# Patient Record
Sex: MALE | Race: WHITE | Employment: FULL TIME | URBAN - METROPOLITAN AREA
[De-identification: names, ages, dates, MRNs, and addresses within clinical notes are randomized per-mention and may not be internally consistent; named-entity substitution may affect disease eponyms.]

---

## 2021-06-16 ENCOUNTER — HOSPITAL ENCOUNTER (EMERGENCY)
Age: 61
Discharge: HOME | End: 2021-06-16
Attending: EMERGENCY MEDICINE
Payer: COMMERCIAL

## 2021-06-16 VITALS
SYSTOLIC BLOOD PRESSURE: 148 MMHG | OXYGEN SATURATION: 98 % | BODY MASS INDEX: 26.72 KG/M2 | DIASTOLIC BLOOD PRESSURE: 88 MMHG | TEMPERATURE: 97.9 F | RESPIRATION RATE: 12 BRPM | WEIGHT: 191.58 LBS | HEART RATE: 47 BPM

## 2021-06-16 DIAGNOSIS — R00.2 PALPITATIONS: Primary | ICD-10-CM

## 2021-06-16 LAB
ALBUMIN SERPL-MCNC: 4.2 G/DL (ref 3.5–5)
ALBUMIN/GLOB SERPL: 1.2 {RATIO} (ref 1.1–2.2)
ALP SERPL-CCNC: 133 U/L (ref 45–117)
ALT SERPL-CCNC: 57 U/L (ref 12–78)
ANION GAP SERPL CALC-SCNC: 12 MMOL/L (ref 5–15)
APTT PPP: 27.5 SEC (ref 22.1–31)
AST SERPL-CCNC: 34 U/L (ref 15–37)
BASOPHILS # BLD: 0.1 K/UL (ref 0–0.1)
BASOPHILS NFR BLD: 1 % (ref 0–1)
BILIRUB SERPL-MCNC: 0.9 MG/DL (ref 0.2–1)
BNP SERPL-MCNC: 44 PG/ML (ref 0–125)
BUN SERPL-MCNC: 22 MG/DL (ref 6–20)
BUN/CREAT SERPL: 19 (ref 12–20)
CALCIUM SERPL-MCNC: 9.4 MG/DL (ref 8.5–10.1)
CHLORIDE SERPL-SCNC: 104 MMOL/L (ref 97–108)
CO2 SERPL-SCNC: 24 MMOL/L (ref 21–32)
COMMENT, HOLDF: NORMAL
CREAT SERPL-MCNC: 1.18 MG/DL (ref 0.7–1.3)
DIFFERENTIAL METHOD BLD: NORMAL
EOSINOPHIL # BLD: 0.1 K/UL (ref 0–0.4)
EOSINOPHIL NFR BLD: 1 % (ref 0–7)
ERYTHROCYTE [DISTWIDTH] IN BLOOD BY AUTOMATED COUNT: 11.9 % (ref 11.5–14.5)
GLOBULIN SER CALC-MCNC: 3.4 G/DL (ref 2–4)
GLUCOSE SERPL-MCNC: 106 MG/DL (ref 65–100)
HCT VFR BLD AUTO: 46.1 % (ref 36.6–50.3)
HGB BLD-MCNC: 15.8 G/DL (ref 12.1–17)
IMM GRANULOCYTES # BLD AUTO: 0 K/UL (ref 0–0.04)
IMM GRANULOCYTES NFR BLD AUTO: 0 % (ref 0–0.5)
INR PPP: 1 (ref 0.9–1.1)
LYMPHOCYTES # BLD: 2 K/UL (ref 0.8–3.5)
LYMPHOCYTES NFR BLD: 29 % (ref 12–49)
MCH RBC QN AUTO: 32.7 PG (ref 26–34)
MCHC RBC AUTO-ENTMCNC: 34.3 G/DL (ref 30–36.5)
MCV RBC AUTO: 95.4 FL (ref 80–99)
MONOCYTES # BLD: 0.7 K/UL (ref 0–1)
MONOCYTES NFR BLD: 10 % (ref 5–13)
NEUTS SEG # BLD: 3.9 K/UL (ref 1.8–8)
NEUTS SEG NFR BLD: 59 % (ref 32–75)
NRBC # BLD: 0 K/UL (ref 0–0.01)
NRBC BLD-RTO: 0 PER 100 WBC
PLATELET # BLD AUTO: 163 K/UL (ref 150–400)
PMV BLD AUTO: 11.3 FL (ref 8.9–12.9)
POTASSIUM SERPL-SCNC: 4.1 MMOL/L (ref 3.5–5.1)
PROT SERPL-MCNC: 7.6 G/DL (ref 6.4–8.2)
PROTHROMBIN TIME: 10.4 SEC (ref 9–11.1)
RBC # BLD AUTO: 4.83 M/UL (ref 4.1–5.7)
SAMPLES BEING HELD,HOLD: NORMAL
SODIUM SERPL-SCNC: 140 MMOL/L (ref 136–145)
T3FREE SERPL-MCNC: 3.1 PG/ML (ref 2.2–4)
T4 FREE SERPL-MCNC: 0.9 NG/DL (ref 0.8–1.5)
THERAPEUTIC RANGE,PTTT: NORMAL SECS (ref 58–77)
TROPONIN I SERPL-MCNC: <0.05 NG/ML
TSH SERPL DL<=0.05 MIU/L-ACNC: 2.54 UIU/ML (ref 0.36–3.74)
WBC # BLD AUTO: 6.7 K/UL (ref 4.1–11.1)

## 2021-06-16 PROCEDURE — 99285 EMERGENCY DEPT VISIT HI MDM: CPT

## 2021-06-16 PROCEDURE — 85025 COMPLETE CBC W/AUTO DIFF WBC: CPT

## 2021-06-16 PROCEDURE — 83880 ASSAY OF NATRIURETIC PEPTIDE: CPT

## 2021-06-16 PROCEDURE — 80053 COMPREHEN METABOLIC PANEL: CPT

## 2021-06-16 PROCEDURE — 84481 FREE ASSAY (FT-3): CPT

## 2021-06-16 PROCEDURE — 84439 ASSAY OF FREE THYROXINE: CPT

## 2021-06-16 PROCEDURE — 85730 THROMBOPLASTIN TIME PARTIAL: CPT

## 2021-06-16 PROCEDURE — 84484 ASSAY OF TROPONIN QUANT: CPT

## 2021-06-16 PROCEDURE — 93005 ELECTROCARDIOGRAM TRACING: CPT

## 2021-06-16 PROCEDURE — 85610 PROTHROMBIN TIME: CPT

## 2021-06-16 PROCEDURE — 84443 ASSAY THYROID STIM HORMONE: CPT

## 2021-06-16 RX ORDER — ATORVASTATIN CALCIUM 40 MG/1
40 TABLET, FILM COATED ORAL DAILY
COMMUNITY

## 2021-06-16 RX ORDER — GLUCOSAMINE SULFATE 1500 MG
1000 POWDER IN PACKET (EA) ORAL DAILY
COMMUNITY

## 2021-06-16 NOTE — ED NOTES
Pt ambulatory out of ED with discharge instructions in hand given by Dr. Kurtz Officer; pt verbalized understanding of discharge paperwork and time allotted for questions. VSS. Pt alert and oriented.

## 2021-06-16 NOTE — ED PROVIDER NOTES
HPI   The patient is a 55-year-old white male who states he thinks he had atrial fibrillation on January 31 at 3 AM.  It was never definitively diagnosed but he woke up again at 3 AM last night feeling funny and took his pulse with his phone which revealed possible atrial fib he stated it lasted for about 30 minutes he had no shortness of breath or chest pain associated with it. He used to take aspirin 80 mg but developed some rectal bleeding that was probably just hemorrhoids. He is currently in normal sinus rhythm has no complaints. Past Medical History:   Diagnosis Date    [facility]     Cancer ([facility] 2012    PROSTATE TREATED WITH RADIATION    Chronic pain     BACK    Prostate cancer ([facility]     Psychiatric disorder     ANXIETY       Past Surgical History:   Procedure Laterality Date    HX GI  2014    COLONOSCOPY POLYP REMOVED    HX ORTHOPAEDIC Right 2012    GANGLION CYST REMOVED    HX ORTHOPAEDIC      back sx    VASCULAR SURGERY PROCEDURE UNLIST Left 2012    ABLATION VEIN STRIPPING         Family History:   Problem Relation Age of Onset    Alzheimer Mother     Hypertension Mother     Cancer Father         LUNG LIVER    MS Sister     Anesth Problems Neg Hx        Social History     Socioeconomic History    Marital status:      Spouse name: Not on file    Number of children: Not on file    Years of education: Not on file    Highest education level: Not on file   Occupational History    Not on file   Tobacco Use    Smoking status: Never Smoker    Smokeless tobacco: Never Used   Substance and Sexual Activity    Alcohol use:  Yes     Alcohol/week: 1.7 standard drinks     Types: 2 Cans of beer per week    Drug use: No    Sexual activity: Not on file   Other Topics Concern    Not on file   Social History Narrative    Not on file     Social Determinants of Health     Financial Resource Strain:     Difficulty of Paying Living Expenses:    Food Insecurity:     Worried About Running Out of Food in the Last Year:    [address] in the Last Year:    Transportation Needs:     Lack of Transportation (Medical):  Lack of Transportation (Non-Medical):    Physical Activity:     Days of Exercise per Week:     Minutes of Exercise per Session:    Stress:     Feeling of Stress :    Social Connections:     Frequency of Communication with Friends and Family:     Frequency of Social Gatherings with Friends and Family:     Attends Sikh Services:     Active Member of Clubs or Organizations:     Attends Club or Organization Meetings:     Marital Status:    Intimate Partner Violence:     Fear of Current or Ex-Partner:     Emotionally Abused:     Physically Abused:     Sexually Abused: ALLERGIES: Patient has no known allergies. Review of Systems   All other systems reviewed and are negative. Vitals:    06/16/21 1000   BP: (!) 156/95   Pulse: (!) 59   Resp: 20   Temp: 97.9 °F (36.6 °C)   SpO2: 100%   Weight: 86.9 kg (191 lb 9.3 oz)            Physical Exam  Vitals and nursing note reviewed. Constitutional:       Appearance: He is well-developed. HENT:      Head: Normocephalic and atraumatic. Mouth/Throat:      Pharynx: No oropharyngeal exudate. Eyes:      General: No scleral icterus. Conjunctiva/sclera: Conjunctivae normal.   Neck:      Thyroid: No thyromegaly. Cardiovascular:      Rate and Rhythm: Normal rate and regular rhythm. Heart sounds: Normal heart sounds. No murmur heard. No friction rub. No gallop. Pulmonary:      Effort: Pulmonary effort is normal. No respiratory distress. Breath sounds: Normal breath sounds. No stridor. No wheezing or rales. Abdominal:      General: Bowel sounds are normal.      Palpations: Abdomen is soft. Tenderness: There is no abdominal tenderness. There is no guarding or rebound. Musculoskeletal:         General: Normal range of motion. Cervical back: Neck supple.    Lymphadenopathy: Cervical: No cervical adenopathy. Skin:     General: Skin is warm and dry. Neurological:      Mental Status: He is alert and oriented to person, place, and time.           MDM       Procedures      Assessment and plan I believe the patient may had a episode of atrial for but not certain have spoken with Dr. Diandra Garcia at [facility] who will see him within a few days for follow-up he is in normal sinus rhythm at this time and his laboratory studies were all normal

## 2021-06-16 NOTE — ED TRIAGE NOTES
Triage: pt woke around 0300 and noted on Apple watch \"AFIB. \" Pt was dx w/AFIB while in [place] in January but wasn't able to get a EKG with rhythm but noticed atrial tachycardia. Denies dizziness, chest pain, SOB, N/V/D, fever.

## 2021-06-17 LAB
ATRIAL RATE: 54 BPM
CALCULATED R AXIS, ECG10: -5 DEGREES
DIAGNOSIS, 93000: NORMAL
P-R INTERVAL, ECG05: 138 MS
Q-T INTERVAL, ECG07: 462 MS
QRS DURATION, ECG06: 80 MS
QTC CALCULATION (BEZET), ECG08: 438 MS
VENTRICULAR RATE, ECG03: 54 BPM

## 2021-06-23 ENCOUNTER — OFFICE VISIT (OUTPATIENT)
Dept: CARDIOLOGY CLINIC | Age: 61
End: 2021-06-23
Payer: COMMERCIAL

## 2021-06-23 VITALS
SYSTOLIC BLOOD PRESSURE: 120 MMHG | HEIGHT: 70 IN | RESPIRATION RATE: 18 BRPM | OXYGEN SATURATION: 99 % | DIASTOLIC BLOOD PRESSURE: 80 MMHG | BODY MASS INDEX: 27.32 KG/M2 | WEIGHT: 190.8 LBS | HEART RATE: 53 BPM

## 2021-06-23 DIAGNOSIS — I48.0 PAROXYSMAL ATRIAL FIBRILLATION (HCC): ICD-10-CM

## 2021-06-23 DIAGNOSIS — E78.2 MIXED HYPERLIPIDEMIA: ICD-10-CM

## 2021-06-23 DIAGNOSIS — I49.1 PAC (PREMATURE ATRIAL CONTRACTION): ICD-10-CM

## 2021-06-23 DIAGNOSIS — Z09 HOSPITAL DISCHARGE FOLLOW-UP: Primary | ICD-10-CM

## 2021-06-23 PROCEDURE — 99204 OFFICE O/P NEW MOD 45 MIN: CPT | Performed by: INTERNAL MEDICINE

## 2021-06-23 PROCEDURE — 1111F DSCHRG MED/CURRENT MED MERGE: CPT | Performed by: INTERNAL MEDICINE

## 2021-06-23 RX ORDER — POLYETHYLENE GLYCOL 3350 17 G/17G
17 POWDER, FOR SOLUTION ORAL DAILY
COMMUNITY

## 2021-06-23 NOTE — LETTER
6/24/2021    Patient: Julieta Haile   YOB: 1960   Date of Visit: 6/23/2021     Valerie Onofre MD  [address] [id number]  Via Fax: [phone]    Dear Valerie Onofre MD,      Thank you for referring Mr. Julieta Haile to [address]  for evaluation. My notes for this consultation are attached. If you have questions, please do not hesitate to call me. I look forward to following your patient along with you.       Sincerely,    Roxie Raphael MD

## 2021-06-24 PROBLEM — E78.2 MIXED HYPERLIPIDEMIA: Status: ACTIVE | Noted: 2021-06-24

## 2021-06-24 PROBLEM — I49.1 PAC (PREMATURE ATRIAL CONTRACTION): Status: ACTIVE | Noted: 2021-06-24

## 2021-06-24 PROBLEM — I48.0 PAROXYSMAL ATRIAL FIBRILLATION (HCC): Status: ACTIVE | Noted: 2021-06-24

## 2021-06-24 NOTE — PROGRESS NOTES
Dr. Lana Madrid. [phone]            Cardiology Consult/Progress Note      Requesting/referring provider: Elvis Bartholomew MD     Reason for Consult: Palpitations    Assessment/Plan:  1. Recurrent paroxysmal atrial fibrillation  2. History of hyperlipidemia on statins  3. History of PACs  4. Reportedly structurally normal heart on echocardiogram    Mr. Melissa Mcintyre has recurrent paroxysmal atrial fibrillation. During A. fib his rates are not significantly elevated and hence I do not strongly feel the need for a beta-blocker. His resting rates are in the 50s to 60s probably related to increased vagal tone because he is very active and until recently has been running marathons. He was previously informed that his heart is structurally normal on echocardiogram earlier this year. Hence I have suggested not to repeat an echocardiogram but we will try to get the reports from [place]. Do not strongly feel that he needs anticoagulation at this time since his chads 2 BASC score is fairly low. We also discussed beta-blockers but given his resting bradycardia and the fact that during A. fib his rates were not elevated, we have deferred that as well. We will see him back in about 1 year. We will also try to review his records from [place] in the interim. Investigations ordered    []    High complexity decision making was performed  []    Patient is at high-risk of decompensation with multiple organ involvement  []    Complex/difficult social determinants of health outcomes    Investigations personally reviewed and interpreted  ECG printed from [facility] recordings demonstrates atrial fibrillation with controlled ventricular response with a rate between 90 to 100 bpm.  Investigations reviewed    HPI: Hesham Rajan, a 61y.o. year-old who presents for evaluation of atrial fibrillation. he is a very active person who recently presented to the emergency room because of irregular heart rate and his apple watch suggesting that he was in atrial fibrillation. He has noted history of brief palpitations since earlier this year. He was previously evaluated in [place] where a  echocardiogram was performed and was reportedly normal.  He had a Holter monitor which had picked up some PACs. He was never previously diagnosed with atrial fibrillation however. On his recent event picked up by his apple watch he was apparently in that atrial fibrillation for many hours and he went to the emergency room which confirmed atrial fibrillation. He was recommended to discuss potential need for anticoagulation and all other therapies as needed. While he was in atrial fibrillation his ventricular response was about . He has not noted any shortness of breath or chest pain with this episode. He  has a past medical history of A-fib ([facility] Cancer ([facility] [address] [facility] (2012), Chronic pain, Prostate cancer ([facility] [address] and Psychiatric disorder. Review of system:Patient reports no dyspnea/PND/Orthpnea/CP. He reports no cough/fever/focal neurological deficits/abdominal pain. All other systems negative except as above. Family History   Problem Relation Age of Onset   Minal Germain Alzheimer Mother     Hypertension Mother     Cancer Father         LUNG LIVER    MS Sister     Anesth Problems Neg Hx       Social History     Socioeconomic History    Marital status:      Spouse name: Not on file    Number of children: Not on file    Years of education: Not on file    Highest education level: Not on file   Tobacco Use    Smoking status: Never Smoker    Smokeless tobacco: Never Used   Substance and Sexual Activity    Alcohol use:  Yes     Alcohol/week: 1.7 standard drinks     Types: 2 Cans of beer per week    Drug use: No     Social Determinants of Health     Financial Resource Strain:     Difficulty of Paying Living Expenses:    Food Insecurity:     Worried About Running Out of Food in the Last Year:  Ran Out of Food in the Last Year:    Transportation Needs:     Lack of Transportation (Medical):  Lack of Transportation (Non-Medical):    Physical Activity:     Days of Exercise per Week:     Minutes of Exercise per Session:    Stress:     Feeling of Stress :    Social Connections:     Frequency of Communication with Friends and Family:     Frequency of Social Gatherings with Friends and Family:     Attends Christian Services:     Active Member of Clubs or Organizations:     Attends Club or Organization Meetings:     Marital Status:       PE  Vitals:    06/23/21 1422   BP: 120/80   Pulse: (!) 53   Resp: 18   SpO2: 99%   Weight: 190 lb 12.8 oz (86.5 kg)   Height: 5' 10\" (1.778 m)    Body mass index is 27.38 kg/m². General:    Alert, cooperative, no distress. Psychiatric:    Normal Mood and affect    Eye/ENT:      Pupils equal, No asymmetry, Conjunctival pink. Able to hear voice at normal amplitude   Lungs:      Visibly symmetric chest expansion, No palpable tenderness. Clear to auscultation bilaterally. Heart[de-identified]    Regular rate and rhythm, S1, S2 normal, no murmur, click, rub or gallop. No JVD, Normal palpable peripheral pulses. No cyanosis   Abdomen:     Soft, non-tender. Bowel sounds normal. No masses,  No      organomegaly. Extremities:   Extremities normal, atraumatic, no edema. Neurologic:   CN II-XII grossly intact.  No gross focal deficits           Recent Labs:  No results found for: CHOL, CHOLX, CHLST, CHOLV, 424445, HDL, HDLP, LDL, LDLC, DLDLP, TGLX, TRIGL, TRIGP, CHHD, CHHDX  Lab Results   Component Value Date/Time    Creatinine 1.18 06/16/2021 10:19 AM     Lab Results   Component Value Date/Time    BUN 22 (H) 06/16/2021 10:19 AM     Lab Results   Component Value Date/Time    Potassium 4.1 06/16/2021 10:19 AM     Lab Results   Component Value Date/Time    Hemoglobin A1c 5.5 09/05/2014 12:20 PM     Lab Results   Component Value Date/Time    HGB 15.8 06/16/2021 10:19 AM     Lab Results   Component Value Date/Time    PLATELET 676 78/59/2580 10:19 AM       Reviewed:  Past Medical History:   Diagnosis Date    [facility]-fib ([facility]     Cancer ([facility] 2012    PROSTATE TREATED WITH RADIATION    Chronic pain     BACK    Prostate cancer ([facility]     Psychiatric disorder     ANXIETY     Social History     Tobacco Use   Smoking Status Never Smoker   Smokeless Tobacco Never Used     Social History     Substance and Sexual Activity   Alcohol Use Yes    Alcohol/week: 1.7 standard drinks    Types: 2 Cans of beer per week     No Known Allergies  Family History   Problem Relation Age of Onset    Alzheimer Mother     Hypertension Mother     Cancer Father         LUNG LIVER    MS Sister     Anesth Problems Neg Hx         Current Outpatient Medications   Medication Sig    polyethylene glycol (Miralax) 17 gram/dose powder Take 17 g by mouth daily.  wheat dextrin/calc gluc,lact (BENEFIBER PLUS CALCIUM PO) Take  by mouth.  atorvastatin (LIPITOR) 40 mg tablet Take 40 mg by mouth daily.  cholecalciferol (VITAMIN D3) 25 mcg (1,000 unit) cap Take 1,000 Units by mouth daily. No current facility-administered medications for this visit. Pancho Cruz MD06/24/21     ATTENTION:   This medical record was transcribed using an electronic medical records/speech recognition system. Although proofread, it may and can contain electronic, spelling and other errors. Corrections may be executed at a later time. Please feel free to contact us for any clarifications as needed.     [facility] [place]  Kristyn YIN [facility]. [phone]

## 2021-06-30 ENCOUNTER — TELEPHONE (OUTPATIENT)
Dept: CARDIOLOGY CLINIC | Age: 61
End: 2021-06-30

## 2021-06-30 NOTE — TELEPHONE ENCOUNTER
Patien calling to inform the doctor that he was in afib on 6/25/21 and also on 6/30/21 around 6 am and lasted about an hour,bpm 146, please advise      [phone]

## 2021-07-02 RX ORDER — METOPROLOL TARTRATE 25 MG/1
25 TABLET, FILM COATED ORAL 2 TIMES DAILY
Qty: 180 TABLET | Refills: 1 | Status: SHIPPED | OUTPATIENT
Start: 2021-07-02 | End: 2021-11-19

## 2021-07-02 NOTE — TELEPHONE ENCOUNTER
Requested Prescriptions     Signed Prescriptions Disp Refills    metoprolol tartrate (LOPRESSOR) 25 mg tablet 180 Tablet 1     Sig: Take 1 Tablet by mouth two (2) times a day.      Authorizing Provider: Marli Andrade     Ordering User: Jagjit Nowak   Date Time Provider Elisha Diaz   6/28/2022  1:00 PM MD PRASHANT Negrete AMB

## 2021-07-02 NOTE — TELEPHONE ENCOUNTER
Lalit Wiliknson MD  You 10 hours ago (10:01 PM)   [facility] on Metoproll 25 mg BID      8:25 AM  Replied to [place].

## 2021-11-19 RX ORDER — METOPROLOL TARTRATE 25 MG/1
25 TABLET, FILM COATED ORAL 2 TIMES DAILY
Qty: 180 TABLET | Refills: 1 | Status: SHIPPED | OUTPATIENT
Start: 2021-11-19 | End: 2022-06-15 | Stop reason: ALTCHOICE

## 2022-03-19 PROBLEM — E78.2 MIXED HYPERLIPIDEMIA: Status: ACTIVE | Noted: 2021-06-24

## 2022-03-19 PROBLEM — I48.0 PAROXYSMAL ATRIAL FIBRILLATION (HCC): Status: ACTIVE | Noted: 2021-06-24

## 2022-03-20 PROBLEM — I49.1 PAC (PREMATURE ATRIAL CONTRACTION): Status: ACTIVE | Noted: 2021-06-24

## 2022-05-31 ENCOUNTER — TELEPHONE (OUTPATIENT)
Dept: CARDIOLOGY CLINIC | Age: 62
End: 2022-05-31

## 2022-05-31 NOTE — TELEPHONE ENCOUNTER
Left message for patient to reschedule appointment on 06/28/2022 with Dr Barak Walker. Provider will not be in office on this day.  Patient needs to reschedule.    Shanon Alert

## 2022-06-15 ENCOUNTER — OFFICE VISIT (OUTPATIENT)
Dept: CARDIOLOGY CLINIC | Age: 62
End: 2022-06-15
Payer: COMMERCIAL

## 2022-06-15 VITALS
HEIGHT: 70 IN | DIASTOLIC BLOOD PRESSURE: 80 MMHG | OXYGEN SATURATION: 96 % | SYSTOLIC BLOOD PRESSURE: 114 MMHG | WEIGHT: 193.8 LBS | RESPIRATION RATE: 14 BRPM | BODY MASS INDEX: 27.75 KG/M2 | HEART RATE: 66 BPM

## 2022-06-15 DIAGNOSIS — E78.2 MIXED HYPERLIPIDEMIA: ICD-10-CM

## 2022-06-15 DIAGNOSIS — I48.0 PAROXYSMAL ATRIAL FIBRILLATION (HCC): Primary | ICD-10-CM

## 2022-06-15 PROCEDURE — 99214 OFFICE O/P EST MOD 30 MIN: CPT | Performed by: INTERNAL MEDICINE

## 2022-06-15 PROCEDURE — 93000 ELECTROCARDIOGRAM COMPLETE: CPT | Performed by: INTERNAL MEDICINE

## 2022-06-15 NOTE — PROGRESS NOTES
Dr. Lisbet Prajapati. [phone]            Cardiology Consult/Progress Note      Requesting/referring provider: Yuridia Gandara MD     Reason for Consult: Palpitations, 1 year follow up. Assessment/Plan:  1. Recurrent paroxysmal atrial fibrillation  2. History of hyperlipidemia on statins  3. History of PACs  4. Reportedly structurally normal heart on echocardiogram    Mr. Penelope Moreno has recurrent paroxysmal atrial fibrillation. He is now off anticoagulation and a izaiah flecainide. No further intervention is required from my standpoint except for annual visits to be continued. Stay on statins given history of hyperlipidemia. We will see him back in 1 year. I discussed the natural course of atrial fibrillation informed him that in future if there is recurrence, future interventions may be required. Investigations ordered    []    High complexity decision making was performed  []    Patient is at high-risk of decompensation with multiple organ involvement  []    Complex/difficult social determinants of health outcomes    Investigations personally reviewed and interpreted  ECG 06/15/22 sinus rhythm, ventricular rate 66  ECG printed from [address] [place] [address] recordings demonstrates atrial fibrillation with controlled ventricular response with a rate between 90 to 100 bpm.  Investigations reviewed    HPI: Jack Strickland, a 64y.o. year-old who presents for evaluation of atrial fibrillation. he is a very active person who recently presented to the emergency room because of irregular heart rate and his apple watch suggesting that he was in atrial fibrillation. He has noted history of brief palpitations since 2021 and was identified to have atrial fibrillation. Previously during episodes of atrial fibrillation his rates were not significantly elevated. However he had another episode late last year when his rates were in 110s to 160 range while he was in [place]. After discussion decided to proceed with A. fib ablation as this was a breakthrough episode despite flecainide. He underwent radiofrequency ablation and [facility] in [place] in December 2022 and is doing well since with no breakthrough symptomatic episodes of atrial fibrillation. He  has a past medical history of A-fib ([address] Cancer ([facility] (2012), Chronic pain, Prostate cancer ([facility] and Psychiatric disorder. Review of system:Patient reports no dyspnea/PND/Orthpnea/CP. He reports no cough/fever/focal neurological deficits/abdominal pain. All other systems negative except as above. Family History   Problem Relation Age of Onset   [facility] Alzheimer's Disease Mother     Hypertension Mother     Cancer Father         LUNG LIVER    Mult Sclerosis Sister     Anesth Problems Neg Hx       Social History     Socioeconomic History    Marital status:    Tobacco Use    Smoking status: Never Smoker    Smokeless tobacco: Never Used   Substance and Sexual Activity    Alcohol use: Yes     Alcohol/week: 1.7 standard drinks     Types: 2 Cans of beer per week    Drug use: No      PE  Vitals:    06/15/22 1411   BP: 114/80   Pulse: 66   Resp: 14   SpO2: 96%   Weight: 193 lb 12.8 oz (87.9 kg)   Height: 5' 10\" (1.778 m)    Body mass index is 27.81 kg/m². General:    Alert, cooperative, no distress. Psychiatric:    Normal Mood and affect    Eye/ENT:      Pupils equal, No asymmetry, Conjunctival pink. Able to hear voice at normal amplitude   Lungs:      Visibly symmetric chest expansion, No palpable tenderness. Clear to auscultation bilaterally. Heart[de-identified]    Regular rate and rhythm, S1, S2 normal, no murmur, click, rub or gallop. No JVD, Normal palpable peripheral pulses. No cyanosis   Abdomen:     Soft, non-tender. Bowel sounds normal. No masses,  No      organomegaly. Extremities:   Extremities normal, atraumatic, no edema. Neurologic:   CN II-XII grossly intact.  No gross focal deficits Recent Labs:  No results found for: CHOL, CHOLX, CHLST, CHOLV, 770596, HDL, HDLP, LDL, LDLC, DLDLP, TGLX, TRIGL, TRIGP, CHHD, CHHDX  Lab Results   Component Value Date/Time    Creatinine 1.18 06/16/2021 10:19 AM     Lab Results   Component Value Date/Time    BUN 22 (H) 06/16/2021 10:19 AM     Lab Results   Component Value Date/Time    Potassium 4.1 06/16/2021 10:19 AM     Lab Results   Component Value Date/Time    Hemoglobin A1c 5.5 09/05/2014 12:20 PM     Lab Results   Component Value Date/Time    HGB 15.8 06/16/2021 10:19 AM     Lab Results   Component Value Date/Time    PLATELET 712 94/04/9401 10:19 AM       Reviewed:  Past Medical History:   Diagnosis Date    [facility]     Cancer ([facility] 2012    PROSTATE TREATED WITH RADIATION    Chronic pain     BACK    Prostate cancer ([facility]     Psychiatric disorder     ANXIETY     Social History     Tobacco Use   Smoking Status Never Smoker   Smokeless Tobacco Never Used     Social History     Substance and Sexual Activity   Alcohol Use Yes    Alcohol/week: 1.7 standard drinks    Types: 2 Cans of beer per week     No Known Allergies  Family History   Problem Relation Age of Onset    Alzheimer's Disease Mother     Hypertension Mother     Cancer Father         LUNG LIVER    Mult Sclerosis Sister     Anesth Problems Neg Hx         Current Outpatient Medications   Medication Sig    polyethylene glycol (Miralax) 17 gram/dose powder Take 17 g by mouth daily.  wheat dextrin/calc gluc,lact (BENEFIBER PLUS CALCIUM PO) Take  by mouth.  atorvastatin (LIPITOR) 40 mg tablet Take 40 mg by mouth daily.  cholecalciferol (VITAMIN D3) 25 mcg (1,000 unit) cap Take 1,000 Units by mouth daily.  metoprolol tartrate (LOPRESSOR) 25 mg tablet TAKE 1 TABLET BY MOUTH TWO (2) TIMES A DAY     No current facility-administered medications for this visit.        Karie Hawkins, ANP06/15/22     ATTENTION:   This medical record was transcribed using an electronic medical records/speech recognition system. Although proofread, it may and can contain electronic, spelling and other errors. Corrections may be executed at a later time. Please feel free to contact us for any clarifications as needed.     [facility] [place]  [facility], [place], [place]. [phone]

## 2022-06-15 NOTE — LETTER
6/15/2022    Patient: Weston Diaz   YOB: 1960   Date of Visit: 6/15/2022     Blake Mccoy MD  [address] [facility] [address]  Via Fax: [phone]    Dear Blake Mccoy MD,      Thank you for referring Mr. Weston Diaz to [address]  for evaluation. My notes for this consultation are attached. If you have questions, please do not hesitate to call me. I look forward to following your patient along with you.       Sincerely,    Luis Manuel Estrada MD

## 2023-05-21 RX ORDER — POLYETHYLENE GLYCOL 3350 17 G/17G
17 POWDER, FOR SOLUTION ORAL DAILY
COMMUNITY

## 2023-05-21 RX ORDER — ATORVASTATIN CALCIUM 40 MG/1
40 TABLET, FILM COATED ORAL DAILY
COMMUNITY